# Patient Record
Sex: FEMALE | Race: WHITE
[De-identification: names, ages, dates, MRNs, and addresses within clinical notes are randomized per-mention and may not be internally consistent; named-entity substitution may affect disease eponyms.]

---

## 2017-06-19 ENCOUNTER — HOSPITAL ENCOUNTER (EMERGENCY)
Dept: HOSPITAL 76 - ED | Age: 27
Discharge: HOME | End: 2017-06-19
Payer: COMMERCIAL

## 2017-06-19 VITALS — DIASTOLIC BLOOD PRESSURE: 89 MMHG | SYSTOLIC BLOOD PRESSURE: 135 MMHG

## 2017-06-19 DIAGNOSIS — R03.0: ICD-10-CM

## 2017-06-19 DIAGNOSIS — H66.93: Primary | ICD-10-CM

## 2017-06-19 PROCEDURE — 99283 EMERGENCY DEPT VISIT LOW MDM: CPT

## 2017-06-19 NOTE — ED PHYSICIAN DOCUMENTATION
History of Present Illness





- Stated complaint


Stated Complaint: EAR PX BILATERAL





- Chief complaint


Chief Complaint: General





- History obtained from


History obtained from: Patient





- Additonal information


Additional information: 





A few days of L eye Irritation, with some discharge especially in the morning, 

rhinorrhea, and left ear popping now with severe right ear pain, and muffled 

hearing but no drainage, no fevers, no possibility of pregnancy.





Review of Systems


Constitutional: denies: Fever, Chills


Eyes: reports: Discharge, Irritation.  denies: Loss of vision


Ears: reports: Loss of hearing, Ear pain.  denies: Drainage/discharge


Nose: reports: Rhinorrhea / runny nose, Congestion


Throat: reports: Sore throat





PD PAST MEDICAL HISTORY





- Past Medical History


HEENT: Other





- Past Surgical History


Past Surgical History: No





- Present Medications


Home Medications: 


 Ambulatory Orders











 Medication  Instructions  Recorded  Confirmed


 


Norethindrone-Ethinyl Estrad 1 mg PO DAILY 09/22/14 12/10/16





[Nortrel 0.5-35 Tablet]   


 


Triamcinolone Acetonide [Nasacort] 1 applic NS DAILY 09/22/14 12/10/16


 


Amox/Clav 875/125 [Augmentin] 1 each PO Q12H #20 tablet 06/19/17 


 


HYDROcod/ACETAM 5/325 [Norco 5/325] 1 - 2 ea PO Q6H PRN #10 tablet 06/19/17 














- Allergies


Allergies/Adverse Reactions: 


 Allergies











Allergy/AdvReac Type Severity Reaction Status Date / Time


 


No Known Drug Allergies Allergy   Verified 06/19/17 21:06














- Social History


Does the pt smoke?: No


Smoking Status: Never smoker


Does the pt drink ETOH?: Yes


Does the pt have substance abuse?: No





- Immunizations


Immunizations are current?: Yes





- POLST


Patient has POLST: No





PD ED PE NORMAL





- Vitals


Vital signs reviewed: Yes





- General


General: Alert and oriented X 3, No acute distress





- HEENT


HEENT: Other (Severe right otitis media, almost to the point of rupture, 

counseled on signs and symptoms for rupture and necessity to followup if it 

happens, she has a viral appearing conjunctivitis on the left, oropharynx 

appears normal, the left TM is red but without other signs of otitis.)





- Neck


Neck: Supple, no meningeal sign, No bony TTP





- Neuro


Neuro: Alert and oriented X 3, Normal speech





- Psych


Psych: Normal mood, Normal affect





Results





- Vitals


Vitals: 


 Vital Signs - 24 hr











  06/19/17





  21:01


 


Temperature 36.6 C


 


Heart Rate 81


 


Respiratory 17





Rate 


 


Blood Pressure 151/105 H


 


O2 Saturation 100








 Oxygen











O2 Source                      Room air

















Departure





- Departure


Disposition: 01 Home, Self Care


Clinical Impression: 


 Otitis media


Condition: Good


Record reviewed to determine appropriate education?: Yes


Instructions:  ED Otitis Media Acute Adult


Prescriptions: 


Amox/Clav 875/125 [Augmentin] 1 each PO Q12H #20 tablet


HYDROcod/ACETAM 5/325 [Norco 5/325] 1 - 2 ea PO Q6H PRN #10 tablet


 PRN Reason: Pain


Comments: 


Call your doctor to arrange a follow up appointment. Make an appointment for 

one week from now. In the interim return anytime if worse or if new symptoms 

develop.





Your blood pressure was elevated today on check in to the emergency department. 

This does not mean that you have hypertension, it is a common phenomenon to 

check into the emergency department and have elevated blood pressure. I 

recommend that you see your primary care physician within the week to have it 

rechecked when you're feeling better.

## 2017-07-24 ENCOUNTER — HOSPITAL ENCOUNTER (OUTPATIENT)
Dept: HOSPITAL 76 - LAB.R | Age: 27
Discharge: HOME | End: 2017-07-24
Attending: PHYSICIAN ASSISTANT
Payer: COMMERCIAL

## 2017-07-24 DIAGNOSIS — J02.9: Primary | ICD-10-CM

## 2017-07-24 PROCEDURE — 87070 CULTURE OTHR SPECIMN AEROBIC: CPT

## 2018-04-15 ENCOUNTER — HOSPITAL ENCOUNTER (EMERGENCY)
Dept: HOSPITAL 76 - ED | Age: 28
Discharge: HOME | End: 2018-04-15
Payer: COMMERCIAL

## 2018-04-15 VITALS — SYSTOLIC BLOOD PRESSURE: 143 MMHG | DIASTOLIC BLOOD PRESSURE: 93 MMHG
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
## 2018-04-15 DIAGNOSIS — S44.91XA: ICD-10-CM

## 2018-04-15 DIAGNOSIS — S09.90XA: Primary | ICD-10-CM

## 2018-04-15 PROCEDURE — 99283 EMERGENCY DEPT VISIT LOW MDM: CPT

## 2018-04-15 PROCEDURE — 70450 CT HEAD/BRAIN W/O DYE: CPT

## 2018-04-15 NOTE — CT REPORT
EXAM:

CT HEAD

 

EXAM DATE: 4/15/2018 10:33 PM.

 

CLINICAL HISTORY: Head injury, R temporal headache, tenderness.

 

COMPARISON: None.

 

TECHNIQUE: Multiaxial CT images were obtained from the foramen magnum to the vertex. Reformats: Coron
al. IV contrast: None.

 

In accordance with CT protocol optimization, one or more of the following dose reduction techniques w
ere utilized for this exam: automated exposure control, adjustment of mA and/or KV based on patient s
ize, or use of iterative reconstructive technique.

 

FINDINGS:

Parenchyma: No intraparenchymal hemorrhage. No evidence of mass, midline shift, or CT findings of inf
arction. Gray-white differentiation is distinct. 

 

Extraaxial Spaces: Normal for age. No subdural or epidural collections identified.

 

Ventricles: Normal in size and position.

 

Sinuses and Orbits: Imaged paranasal sinuses, orbits, and mastoids show no significant abnormality.

 

Bones: No evidence of fracture or calvarial defect.

 

Other: None.

 

IMPRESSION: Normal head CT.

 

[facility]

Referring Provider Line: [phone]

 

SITE ID: [id number]

## 2018-04-15 NOTE — ED PHYSICIAN DOCUMENTATION
History of Present Illness





- Stated complaint


Stated Complaint: HEAD PX





- Chief complaint


Chief Complaint: Trauma Hd/Nk





- History obtained from


History obtained from: Patient, Friend





- History of Present Illness


Timing: Yesterday


Pain level max: 6


Pain level now: 3


Improved by: rest


Worsened by: movement





- Additonal information


Additional information: 





Patient is a 27-year-old female who was participating in an EMT class, she was 

acting out a scenario when she went down to the ground and accidentally struck 

her head on another participants knee.  Hit the right parietal area as well as 

the right ear.  States that she "saw stars".  Has had waxing and waning 

headache since that time.  No loss of consciousness.  She also has had 

intermittent numbness and tingling to the outer aspect of the right arm since 

that time.  No neck or back pain.





Review of Systems


Eyes: denies: Decreased vision, Photophobia


Ears: reports: Ear pain (R ear)


Throat: denies: Sore throat


Cardiac: denies: Chest pain / pressure


Respiratory: denies: Cough


GI: denies: Nausea, Vomiting


: denies: Now pregnant EGA


Skin: denies: Rash


Musculoskeletal: denies: Neck pain, Back pain


Neurologic: denies: Focal weakness, Confused, Altered mental status





PD PAST MEDICAL HISTORY





- Past Medical History


Past Medical History: Yes


HEENT: Other





- Past Surgical History


Past Surgical History: Yes


Ortho: Knee replacement





- Present Medications


Home Medications: 


 Ambulatory Orders











 Medication  Instructions  Recorded  Confirmed


 


Norethindrone-Ethinyl Estrad 1 mg PO DAILY 09/22/14 12/10/16





[Nortrel 0.5-35 Tablet]   


 


Triamcinolone Acetonide [Nasacort] 1 applic NS DAILY 09/22/14 12/10/16


 


Amox/Clav 875/125 [Augmentin] 1 each PO Q12H #20 tablet 06/19/17 


 


HYDROcod/ACETAM 5/325 [Norco 5/325] 1 - 2 ea PO Q6H PRN #10 tablet 06/19/17 














- Allergies


Allergies/Adverse Reactions: 


 Allergies











Allergy/AdvReac Type Severity Reaction Status Date / Time


 


No Known Drug Allergies Allergy   Verified 04/15/18 21:49














- Social History


Does the pt smoke?: No


Smoking Status: Never smoker


Does the pt drink ETOH?: Yes


Does the pt have substance abuse?: No





- Immunizations


Immunizations are current?: Yes





- POLST


Patient has POLST: No





PD ED PE NORMAL





- Vitals


Vital signs reviewed: Yes





- General


General: Alert and oriented X 3, No acute distress





- HEENT


HEENT: PERRL, Moist mucous membranes, Pharynx benign, Other (R pinna mild 

ecchymosis, no swelling. No hemotympanum B.)





- Neck


Neck: Supple, no meningeal sign, No bony TTP, Other (FROM without pain. Neg 

spurlings test B.)





- Cardiac


Cardiac: RRR, Strong equal pulses





- Respiratory


Respiratory: No respiratory distress, Clear bilaterally





- Abdomen


Abdomen: Soft, Non tender, Non distended





- Back


Back: No spinal TTP





- Derm


Derm: Warm and dry





- Extremities


Extremities: No tenderness to palpate, Normal ROM s pain





- Neuro


Neuro: Alert and oriented X 3, CNs 2-12 intact, No motor deficit, No sensory 

deficit, Normal speech


Eye Opening: Spontaneous


Motor: Obeys Commands


Verbal: Oriented


GCS Score: 15





- Psych


Psych: Normal mood, Normal affect





Results





- Vitals


Vitals: 


 Vital Signs - 24 hr











  04/15/18





  21:40


 


Temperature 36.0 C L


 


Heart Rate 89


 


Respiratory 16





Rate 


 


Blood Pressure 143/93 H


 


O2 Saturation 100








 Oxygen











O2 Source                      Room air

















- Rads (name of study)


  ** head CT


Radiology: Prelim report reviewed, EMP read contemporaneously, See rad report (

normal)





PD MEDICAL DECISION MAKING





- ED course


Complexity details: reviewed results, re-evaluated patient, considered 

differential, d/w patient


ED course: 





Patient is a 27-year-old female who presents to the emergency department after 

striking the right side of her head on a another individual's knee yesterday.  

This is over the area of the middle meningeal artery, given her progressive 

symptoms, head CT was performed which does not reveal any acute abnormalities.  

She also appears to have intermittent neurapraxia of the right upper extremity.

  Expect that this will resolve over time.  No acute neurological deficits at 

this time.  No evidence of cervical spine fracture.  We will continue 

supportive care and follow-up with her doctor as needed.  Patient counseled 

regarding signs and symptoms for which I believe and urgent re-evaluation would 

be necessary. Patient with good understanding of and agreement to plan and is 

comfortable going home at this time





This document was made in part using voice recognition software. While efforts 

are made to proofread this document, sound alike and grammatical errors may 

occur.





Departure





- Departure


Disposition: 01 Home, Self Care


Clinical Impression: 


Head injury


Qualifiers:


 Encounter type: initial encounter Qualified Code(s): S09.90XA - Unspecified 

injury of head, initial encounter





Neuropraxia of right upper extremity


Qualifiers:


 Encounter type: initial encounter Qualified Code(s): S44.91XA - Injury of 

unspecified nerve at shoulder and upper arm level, right arm, initial encounter





Condition: Good


Instructions:  ED Head Injury Closed


Follow-Up: 


Ce Mathis PA-C [Primary Care Provider] - As Needed


Comments: 


Return if you worsen. This should improve over the next few days. 


Discharge Date/Time: 04/15/18 23:21

## 2018-05-08 ENCOUNTER — HOSPITAL ENCOUNTER (OUTPATIENT)
Dept: HOSPITAL 76 - DI | Age: 28
Discharge: HOME | End: 2018-05-08
Attending: PHYSICIAN ASSISTANT
Payer: COMMERCIAL

## 2018-05-08 DIAGNOSIS — S06.0X0A: Primary | ICD-10-CM

## 2018-05-08 PROCEDURE — 70450 CT HEAD/BRAIN W/O DYE: CPT

## 2018-05-08 NOTE — CT PRELIMINARY REPORT
Accession: [id number]

Exam: CT HEAD W/O

 

IMPRESSION: Negative nonenhanced head CT.

 

[facility]

 

SITE ID: [id number]

## 2018-09-22 ENCOUNTER — HOSPITAL ENCOUNTER (OUTPATIENT)
Dept: HOSPITAL 76 - EMS | Age: 28
Discharge: ANOTHER HOSPITAL | End: 2018-09-22
Attending: SURGERY
Payer: COMMERCIAL

## 2018-09-22 ENCOUNTER — HOSPITAL ENCOUNTER (EMERGENCY)
Dept: HOSPITAL 76 - ED | Age: 28
Discharge: HOME | End: 2018-09-22
Payer: COMMERCIAL

## 2018-09-22 VITALS — SYSTOLIC BLOOD PRESSURE: 127 MMHG | DIASTOLIC BLOOD PRESSURE: 93 MMHG
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
## 2018-09-22 DIAGNOSIS — S01.01XA: Primary | ICD-10-CM
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
## 2018-09-22 DIAGNOSIS — M25.572: ICD-10-CM

## 2018-09-22 DIAGNOSIS — M25.511: ICD-10-CM
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
## 2018-09-22 DIAGNOSIS — S01.01XA: ICD-10-CM

## 2018-09-22 DIAGNOSIS — S93.401A: ICD-10-CM

## 2018-09-22 DIAGNOSIS — S13.9XXA: ICD-10-CM

## 2018-09-22 DIAGNOSIS — S09.90XA: Primary | ICD-10-CM

## 2018-09-22 PROCEDURE — 12002 RPR S/N/AX/GEN/TRNK2.6-7.5CM: CPT

## 2018-09-22 PROCEDURE — 73610 X-RAY EXAM OF ANKLE: CPT

## 2018-09-22 PROCEDURE — 70450 CT HEAD/BRAIN W/O DYE: CPT

## 2018-09-22 PROCEDURE — 99283 EMERGENCY DEPT VISIT LOW MDM: CPT

## 2018-09-22 PROCEDURE — 99284 EMERGENCY DEPT VISIT MOD MDM: CPT

## 2018-09-22 NOTE — CT REPORT
Reason:  fall down stairs posterior hematoma lac HA

Procedure Date:  09/22/2018   

Accession Number:  [id number] / [id number]                    

Procedure:  CT  - Head W/O CPT Code:  

 

FULL RESULT:

 

 

EXAM:

CT HEAD

 

EXAM DATE: 9/22/2018 08:04 AM.

 

CLINICAL HISTORY: Fall down stairs posterior hematoma lac HA.

 

COMPARISON: None.

 

TECHNIQUE: Multiaxial CT images were obtained from the foramen magnum to 

the vertex. Reformats: Sagittal and coronal. IV contrast: None.

 

In accordance with CT protocol optimization, one or more of the following 

dose reduction techniques were utilized for this exam: automated exposure 

control, adjustment of mA and/or KV based on patient size, or use of 

iterative reconstructive technique.

 

FINDINGS:

Parenchyma: No intraparenchymal hemorrhage. No evidence of mass, midline 

shift, or CT findings of infarction. Gray-white differentiation is 

distinct.

 

Extraaxial Spaces: Normal for age. No subdural or epidural collections 

identified.

 

Ventricles: Normal in size and position.

 

Sinuses and Orbits: Imaged paranasal sinuses, orbits, and mastoids show 

no significant abnormality.

 

Bones: No evidence of fracture or calvarial defect.

 

Other: None.

IMPRESSION: No acute intracranial abnormality.

 

[facility]

## 2018-09-22 NOTE — ED PHYSICIAN DOCUMENTATION
History of Present Illness





- Stated complaint


Stated Complaint: FALL/ HEAD LAC





- Chief complaint


Chief Complaint: Trauma Hd/Nk





- Additonal information


Additional information: 





hx from pt


29 y/o f


[facility] staff


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 

her boots unzipped


missed last step and fell


struck head and has a posterior hematoma and lac with heavy bleeding, no LOC but

dazed and feels nauseated and has a terrible HA, several recent concussions as 

well


aggrevated her known R rotator cuff injury


and twisted her R ankle


denies preg


Tdap 6 yr ago








Review of Systems


Constitutional: denies: Fever


Ears: denies: Ear pain, Drainage/discharge


Nose: denies: Epistaxis


Cardiac: denies: Chest pain / pressure


Respiratory: denies: Dyspnea


GI: denies: Abdominal Pain


: denies: Now pregnant EGA


Musculoskeletal: reports: Neck pain (right side, not midline)


Neurologic: reports: Headache, Head injury.  denies: Focal weakness, Numbness, 

Syncope


Endocrine: denies: Easy bruising / bleeding


Immunocompromised: denies: Immunocompromised





PD PAST MEDICAL HISTORY





- Past Medical History


Past Medical History: No


HEENT: Other





- Past Surgical History


Past Surgical History: Yes


Ortho: Knee replacement





- Present Medications


Home Medications: 


                                Ambulatory Orders











 Medication  Instructions  Recorded  Confirmed


 


Norethindrone-Ethinyl Estrad 1 mg PO DAILY 09/22/14 12/10/16





[Nortrel 0.5-35 Tablet]   


 


Triamcinolone Acetonide [Nasacort] 1 applic NS DAILY 09/22/14 12/10/16


 


Amox/Clav 875/125 [Augmentin] 1 each PO Q12H #20 tablet 06/19/17 


 


HYDROcod/ACETAM 5/325 [Norco 5/325] 1 - 2 ea PO Q6H PRN #10 tablet 06/19/17 














- Allergies


Allergies/Adverse Reactions: 


                                    Allergies











Allergy/AdvReac Type Severity Reaction Status Date / Time


 


No Known Drug Allergies Allergy   Verified 09/22/18 06:58














- Social History


Does the pt smoke?: No


Smoking Status: Never smoker


Does the pt drink ETOH?: Yes


Does the pt have substance abuse?: No





- Immunizations


Immunizations are current?: Yes





- POLST


Patient has POLST: No





PD ED PE NORMAL





- Vitals


Vital signs reviewed: Yes





- General


General: Alert and oriented X 3





- HEENT


HEENT: No: Atraumatic (hematoma and lac R occiput)





- Neck


Neck: No bony TTP, Other (soft tissue TTP on R, cleared by NEXUS)





- Cardiac


Cardiac: RRR





- Respiratory


Respiratory: No respiratory distress





- Abdomen


Abdomen: Soft, Non tender





- Derm


Derm: Normal color





- Extremities


Extremities: Other (R ankel with lateral swellign and TTP, MSV intact, no 5th MT

TTP, R shoulder with painful ROM but able and no deformity and pt states feels 

like her known rotator cuff injury)





- Neuro


Neuro: Alert and oriented X 3


Eye Opening: Spontaneous


Motor: Obeys Commands


Verbal: Oriented


GCS Score: 15





Results





- Vitals


Vitals: 


                               Vital Signs - 24 hr











  09/22/18





  06:54


 


Temperature 36.6 C


 


Heart Rate 94


 


Respiratory 18





Rate 


 


Blood Pressure 131/88 H


 


O2 Saturation 100








                                     Oxygen











O2 Source                      Room air

















Procedures





- Laceration (location)


  ** scalp


Length in cm: 3


Wound type: Linear


Neurovascular status: Sensory intact, Motor intact


Anesthesia: Marcaine 0.5% with epi


Wound Preparation: Irrigated copiously NS (by Mera)


Skin layer closure: Staples (4)


Complexity: Simple





- C spine clearance


Nexus C spine guidelines: No: Abnormal mental status, Intoxicated, Distracting 

injury, C/of midline pain, Parasthesias/neuro def, Bony tenderness, Pain with 

ROM, Other





PD MEDICAL DECISION MAKING





- Sepsis Event


Vital Signs: 


                               Vital Signs - 24 hr











  09/22/18





  06:54


 


Temperature 36.6 C


 


Heart Rate 94


 


Respiratory 18





Rate 


 


Blood Pressure 131/88 H


 


O2 Saturation 100








                                     Oxygen











O2 Source                      Room air

















Departure





- Departure


Disposition: 01 Home, Self Care


Clinical Impression: 


Head injury


Qualifiers:


 Encounter type: initial encounter Qualified Code(s): S09.90XA - Unspecified 

injury of head, initial encounter





Laceration of scalp


Qualifiers:


 Encounter type: initial encounter Qualified Code(s): S01.01XA - Laceration 

without foreign body of scalp, initial encounter





Acute neck sprain


Qualifiers:


 Encounter type: initial encounter Qualified Code(s): S13.9XXA - Sprain of 

joints and ligaments of unspecified parts of neck, initial encounter





Ankle sprain


Qualifiers:


 Encounter type: initial encounter Involved ligament of ankle: unspecified 

ligament Laterality: right Qualified Code(s): S93.401A - Sprain of unspecified 

ligament of right ankle, initial encounter





Fall down stairs


Qualifiers:


 Encounter type: initial encounter Qualified Code(s): W10.8XXA - Fall (on) 

(from) other stairs and steps, initial encounter





Condition: Good


Instructions:  ED Sprain Ankle W X Ray, ED Head Injury Closed, ED Sprain Strain 

Neck


Follow-Up: 


FREEDOM THOMAS [Primary Care Provider] - 


Comments: 


The CT showed no skull fracture or brain bleeding swelling


The ankle xray did not show a fracture


It is safe for you to go home


May shower with the staples


Apply antibiotic ointment every day


Staples out in 10 days


Wear the walking boot as needed - take it off when sitting and sleeping to 

prevent forming blood clots from immobilization.


If your ankle is still too painful to walk in 2 weeks, please see you PMD for a 

recheck and consideration of further imaging.


Motrin tylenol and ice as needed for pain


Off work today and tomorrow


May return to work Monday but will be on limited weight bearing status for up to

2 weeks while your ankle heals


Return if worse





Forms:  Activity restrictions

## 2018-09-22 NOTE — XRAY REPORT
Reason:  fall down stairs

Procedure Date:  09/22/2018   

Accession Number:  [id number] / [id number]                    

Procedure:  XR  - Ankle 3 View RT CPT Code:  

 

FULL RESULT:

 

 

EXAM:

RIGHT ANKLE RADIOGRAPHY

 

EXAM DATE: 9/22/2018 07:55 AM.

 

CLINICAL HISTORY: Fall down stairs.

 

COMPARISON: None.

 

TECHNIQUE: 3 views.

 

FINDINGS:

Bones: Normal. No fractures or bone lesions.

 

Joints: Normal. No effusion. No subluxations. The ankle mortise is 

normally aligned.

 

Soft Tissues: Normal. No soft tissue swelling.

IMPRESSION: Normal ankle radiography.

 

[facility]

## 2018-10-09 ENCOUNTER — HOSPITAL ENCOUNTER (OUTPATIENT)
Dept: HOSPITAL 76 - DI | Age: 28
Discharge: HOME | End: 2018-10-09
Attending: FAMILY MEDICINE
Payer: COMMERCIAL

## 2018-10-09 DIAGNOSIS — M67.911: ICD-10-CM

## 2018-10-09 DIAGNOSIS — S43.491A: Primary | ICD-10-CM

## 2018-10-09 NOTE — MRI REPORT
Reason:  PAIN IN RIGHT SHOULDER

Procedure Date:  10/09/2018   

Accession Number:  [id number] / [id number]                    

Procedure:  MRI - Shoulder RT W/O CPT Code:  

 

FULL RESULT:

 

 

EXAM:

RIGHT SHOULDER MRI WITHOUT CONTRAST

 

EXAM DATE: 10/9/2018 01:46 PM.

 

CLINICAL HISTORY: PAIN IN RIGHT SHOULDER.

 

COMPARISON: None.

 

TECHNIQUE: Multiplanar, multisequence T1-weighted and fluid-sensitive 

sequences of the shoulder without contrast. Other: None.

 

FINDINGS:

Rotator cuff: Patchy increased T2 signal involving distal fibers of the 

supraspinatus and infraspinatus. No rotator cuff tear identified. No 

significant rotator cuff muscle atrophy or fatty replacement.

 

Long head biceps tendon: Intact demonstrating normal course, signal and 

morphology.

 

Labrum: Tear at the inferior and anteroinferior labrum with 

anteroinferior para labral cyst formation measuring up to 4.2 x 2.4 x 3.5 

cm. Extension deep to the subscapularis. No extension to the 

quadrilateral space.

 

Bones and articular surfaces: No significant articular cartilage defects.

 

Acromioclavicular joint: Normal appearance. Type I acromion.

IMPRESSION:

1. Tear of the inferior and anteroinferior labrum with anteroinferior 

para labral cyst formation.

2. Mild supraspinatus and infraspinatus tendinosis.

 

[facility] MUSCULOSKELETAL RADIOLOGY SECTION

## 2018-12-27 ENCOUNTER — HOSPITAL ENCOUNTER (EMERGENCY)
Dept: HOSPITAL 76 - ED | Age: 28
LOS: 1 days | Discharge: HOME | End: 2018-12-28
Payer: COMMERCIAL

## 2018-12-27 VITALS — SYSTOLIC BLOOD PRESSURE: 147 MMHG | DIASTOLIC BLOOD PRESSURE: 91 MMHG
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
## 2018-12-27 DIAGNOSIS — Z96.659: ICD-10-CM

## 2018-12-27 DIAGNOSIS — S39.012A: Primary | ICD-10-CM

## 2018-12-27 PROCEDURE — 99283 EMERGENCY DEPT VISIT LOW MDM: CPT

## 2018-12-27 NOTE — ED PHYSICIAN DOCUMENTATION
PD HPI BACK PAIN





- Stated complaint


Stated Complaint: BACK PX





- Chief complaint


Chief Complaint: Back Pain





- History obtained from


History obtained from: Patient, Family





- History of Present Illness


Timing - onset: Yesterday


Timing - duration: Days (1)


Timing - details: Gradual onset, Still present


Location: Lower, Left


Quality: Pain, Spasm


Associated symptoms: No: Fever, Weakness, Numbness, Incontinent of urine, Unable

to urinate, Hematuria, Incontinent of stool


Improves with: Rest, Position


Worsened by: Movement


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


Similar symptoms before: Has not had sx before


Recently seen: Not recently seen





- Additional information


Additional information: 





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 

night toward the end of her shift.  She does not recall any specific injury 

during the shift and does not recall a specific injury over the past 4 days.  

She does lift patients frequently.  She has tried some Flexeril last night was 

unable to sleep. She has not been able to control the pain. She describes the 

pain as intense spasm of the lower paraspinal muscles.





Review of Systems


Constitutional: denies: Fever


Eyes: denies: Decreased vision


Ears: denies: Ear pain


Nose: denies: Congestion


Throat: denies: Sore throat


Cardiac: denies: Chest pain / pressure, Palpitations


Respiratory: denies: Dyspnea, Cough


GI: denies: Abdominal Pain, Nausea, Vomiting, Constipation, Diarrhea


: denies: Dysuria, Frequency


Skin: denies: Rash


Musculoskeletal: reports: Back pain.  denies: Neck pain, Extremity pain


Neurologic: denies: Generalized weakness, Focal weakness, Numbness





PD PAST MEDICAL HISTORY





- Past Medical History


HEENT: Other





- Past Surgical History


Past Surgical History: Yes


Ortho: Knee replacement





- Present Medications


Home Medications: 


                                Ambulatory Orders











 Medication  Instructions  Recorded  Confirmed


 


Norethindrone-Ethinyl Estrad 1 mg PO DAILY 09/22/14 12/10/16





[Nortrel 0.5-35 Tablet]   


 


Triamcinolone Acetonide [Nasacort] 1 applic NS DAILY 09/22/14 12/10/16


 


Amox/Clav 875/125 [Augmentin] 1 each PO Q12H #20 tablet 06/19/17 


 


HYDROcod/ACETAM 5/325 [Norco 5/325] 1 - 2 ea PO Q6H PRN #10 tablet 06/19/17 


 


Cyclobenzaprine [Flexeril] 10 mg PO TID PRN #20 tablet 12/27/18 


 


Hydrocodone/Acetaminophen 1 - 2 each PO Q6H PRN #14 tablet 12/27/18 





[Hydrocodon-Acetaminophen 5-325]   














- Allergies


Allergies/Adverse Reactions: 


                                    Allergies











Allergy/AdvReac Type Severity Reaction Status Date / Time


 


No Known Drug Allergies Allergy   Verified 12/27/18 22:59














- Social History


Does the pt smoke?: No


Smoking Status: Never smoker


Does the pt drink ETOH?: Yes


Does the pt have substance abuse?: No





- Immunizations


Immunizations are current?: Yes





- POLST


Patient has POLST: No





PD ED PE NORMAL





- Vitals


Vital signs reviewed: Yes (tachy and hypertensive )





- General


General: Alert and oriented X 3, No acute distress, Well developed/nourished





- HEENT


HEENT: Atraumatic, PERRL, EOMI





- Neck


Neck: Supple, no meningeal sign, No bony TTP





- Respiratory


Respiratory: No respiratory distress





- Back


Back: No CVA TTP, No spinal TTP, Other (There is mild point tenderness to the 

lower lumbar paraspinous muscles bilaterally)





- Derm


Derm: Normal color, Warm and dry, No rash





- Extremities


Extremities: No deformity, No edema, No calf tenderness / cord





- Neuro


Neuro: Alert and oriented X 3, CNs 2-12 intact, No motor deficit, No sensory 

deficit, Normal speech, Other (normal dorsiflexion bilaterally )


Eye Opening: Spontaneous


Motor: Obeys Commands


Verbal: Oriented


GCS Score: 15





- Psych


Psych: Normal mood, Normal affect





Results





- Vitals


Vitals: 


                               Vital Signs - 24 hr











  12/27/18





  22:54


 


Temperature 36.1 C L


 


Heart Rate 116 H


 


Respiratory 17





Rate 


 


Blood Pressure 147/91 H


 


O2 Saturation 97








                                     Oxygen











O2 Source                      Room air

















PD MEDICAL DECISION MAKING





- ED course


Complexity details: considered differential, d/w patient, d/w family


ED course: 





28-year-old female with acute lumbar muscle spasm is administered DEXA methadone

10 mg orally and we will place her on some Vicodin and Flexeril.





Departure





- Departure


Disposition: 01 Home, Self Care


Clinical Impression: 


Acute lumbar myofascial strain


Qualifiers:


 Encounter type: initial encounter Qualified Code(s): S39.012A - Strain of 

muscle, fascia and tendon of lower back, initial encounter





Condition: Stable


Instructions:  ED Sprain Strain Lumbar


Follow-Up: 


FREEDOM THOMAS [Primary Care Provider] - 


Prescriptions: 


Cyclobenzaprine [Flexeril] 10 mg PO TID PRN #20 tablet


 PRN Reason: Spasms


Hydrocodone/Acetaminophen [Hydrocodon-Acetaminophen 5-325] 1 - 2 each PO Q6H PRN

#14 tablet


 PRN Reason: pain


Forms:  Activity restrictions

## 2019-10-16 ENCOUNTER — HOSPITAL ENCOUNTER (EMERGENCY)
Dept: HOSPITAL 76 - ED | Age: 29
Discharge: HOME | End: 2019-10-16
Payer: COMMERCIAL

## 2019-10-16 VITALS — SYSTOLIC BLOOD PRESSURE: 144 MMHG | DIASTOLIC BLOOD PRESSURE: 111 MMHG
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
## 2019-10-16 DIAGNOSIS — S46.911A: Primary | ICD-10-CM
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
## 2019-10-16 PROCEDURE — 73030 X-RAY EXAM OF SHOULDER: CPT

## 2019-10-16 PROCEDURE — 99284 EMERGENCY DEPT VISIT MOD MDM: CPT

## 2019-10-16 PROCEDURE — 99283 EMERGENCY DEPT VISIT LOW MDM: CPT

## 2019-10-16 NOTE — XRAY REPORT
Reason:  right shoulder strain, twisted by pt

Procedure Date:  10/16/2019   

Accession Number:  [id number] / [id number]                    

Procedure:  XR  - Shoulder 3 View RT CPT Code:  

 

FULL RESULT:

 

 

EXAM:

RIGHT SHOULDER RADIOGRAPHY

 

EXAM DATE: 10/16/2019 01:12 AM.

 

CLINICAL HISTORY: Right shoulder strain, twisted by pt.

 

COMPARISON: None.

 

TECHNIQUE: 3 views.

 

FINDINGS:

Bones: Normal. No fracture or bone lesion.

 

Joints: The glenohumeral and acromioclavicular joints are normal.

 

Soft tissues: The visualized hemithorax is unremarkable. No soft tissue 

swelling.

IMPRESSION: Normal shoulder radiography.

 

[facility]

## 2019-10-16 NOTE — ED PHYSICIAN DOCUMENTATION
PD HPI UPPER EXT INJURY





- Stated complaint


Stated Complaint: R SHOULDER PX





- Chief complaint


Chief Complaint: Trauma Ext





- History obtained from


History obtained from: Patient





- History of Present Illness


Location: Right, Shoulder


Type of injury: Twist (She was working in the emergency department and was 

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

patient grabbed that her arm and pulled out it.  She states she felt her 

shoulder have a pop feeling and has been hurting with range of motion since that

time.)


Where injury occurred: Work


Timing - onset: How many hours ago (1)


Timing - duration: Hours (1)


Timing - details: Abrupt onset, Still present


Worsened by: Moving


Associated symptoms: No: Weakness, Numbness


Similar symptoms before: Has not had sx before (She has been having pain in her 

shoulder from a labral tear limiting use somewhat.  There is no rotator cuff 

injury and she has not had pain like the current injury per se.)





Review of Systems


Skin: denies: Abrasion (s), Laceration (s)


Neurologic: reports: Numbness (feels her hand has numbness since the injury.).  

denies: Focal weakness





PD PAST MEDICAL HISTORY





- Past Medical History


Cardiovascular: None


Respiratory: None


Neuro: None


Endocrine/Autoimmune: None


HEENT: Other


Musculoskeletal: Other (right shoulder labral tear, and seeing Dr. Byrne)





- Past Surgical History


Past Surgical History: Yes


Ortho: Knee replacement





- Present Medications


Home Medications: 


                                Ambulatory Orders











 Medication  Instructions  Recorded  Confirmed


 


Norethindrone-Ethinyl Estrad 1 mg PO DAILY 09/22/14 12/10/16





[Nortrel 0.5-35 Tablet]   


 


Triamcinolone Acetonide [Nasacort] 1 applic NS DAILY 09/22/14 12/10/16


 


Amox/Clav 875/125 [Augmentin] 1 each PO Q12H #20 tablet 06/19/17 


 


HYDROcod/ACETAM 5/325 [Norco 5/325] 1 - 2 ea PO Q6H PRN #10 tablet 06/19/17 


 


Cyclobenzaprine [Flexeril] 10 mg PO TID PRN #20 tablet 12/27/18 


 


Hydrocodone/Acetaminophen 1 - 2 each PO Q6H PRN #14 tablet 12/27/18 





[Hydrocodon-Acetaminophen 5-325]   


 


Tizanidine HCl 4 mg PO TID PRN #25 capsule 10/16/19 














- Allergies


Allergies/Adverse Reactions: 


                                    Allergies











Allergy/AdvReac Type Severity Reaction Status Date / Time


 


No Known Drug Allergies Allergy   Verified 12/27/18 22:59














- Social History


Does the pt smoke?: No


Smoking Status: Never smoker


Does the pt drink ETOH?: Yes


Does the pt have substance abuse?: No





- Immunizations


Immunizations are current?: Yes





- POLST


Patient has POLST: No





PD ED PE NORMAL





- Vitals


Vital signs reviewed: Yes





- General


General: Alert and oriented X 3, Well developed/nourished, Other (She appears 

uncomfortable with her arm down to her side.  She has discomfort with raising it

up to a neutral position so it is having hand and arm dependent with some 

increased vascular coloring.  There is good color and capillary refill of the 

nailbeds.  There is good pulses at the wrists.  She has sensation present 

throughout the arm and forearm.)





- Neck


Neck: Supple, no meningeal sign, No bony TTP, No adenopathy





- Derm


Derm: Normal color, Warm and dry





- Extremities


Extremities: Other (The right shoulder is tender along the anterolateral aspect.

 There is no obvious deformity noted.  Limited range of motion due to discomfort

and pain.  There is no obvious effusion.  Pulses color and capillary refill are 

normal in the wrist and fingers.  She has sensation present to touch in the d

ermatomes.)





- Neuro


Neuro: No motor deficit, No sensory deficit





Results





- Vitals


Vitals: 


                               Vital Signs - 24 hr











  10/16/19 10/16/19 10/16/19





  00:25 02:16 02:20


 


Temperature 36.5 C  


 


Heart Rate 76 70 


 


Respiratory 16 16 





Rate   


 


Blood Pressure 137/87 H  144/111 H


 


O2 Saturation 100 100 














  10/16/19





  02:26


 


Temperature 


 


Heart Rate 


 


Respiratory 18





Rate 


 


Blood Pressure 


 


O2 Saturation 








                                     Oxygen











O2 Source                      Room air

















- Rads (name of study)


  ** right shoulder


Radiology: Prelim report reviewed (No fracture no dislocation.), See rad report





PD MEDICAL DECISION MAKING





- ED course


Complexity details: reviewed results (normal xray), considered differential, d/w

patient





Departure





- Departure


Disposition: 01 Home, Self Care


Clinical Impression: 


Right shoulder strain


Qualifiers:


 Encounter type: initial encounter Qualified Code(s): S46.911A - Strain of 

unspecified muscle, fascia and tendon at shoulder and upper arm level, right 

arm, initial encounter





Condition: Stable


Record reviewed to determine appropriate education?: Yes


Instructions:  ED Sprain Shoulder


Follow-Up: 


Kevin Byrne MD [Provider Admit Priv/Credential] - 


Prescriptions: 


Tizanidine HCl 4 mg PO TID PRN #25 capsule


 PRN Reason: Spasms


Comments: 


Sling for the shoulder with gentle range of motion several times daily to help 

reduce stiffness.  Ice to the area periodically.  Use some anti-inflammatories 

such as ibuprofen or naproxen 2-3 times daily just regular dosing for the next 5

days.  Add tizanidine if needed for muscle spasms.  Add Tylenol for pain.  

Follow-up with Dr. Chi on Monday as scheduled.  Limited to no use of the 

right shoulder in the meantime.


Forms:  Activity restrictions


Discharge Date/Time: 10/16/19 02:35

## 2020-01-01 ENCOUNTER — HOSPITAL ENCOUNTER (EMERGENCY)
Dept: HOSPITAL 76 - ED | Age: 30
Discharge: HOME | End: 2020-01-01
Payer: COMMERCIAL

## 2020-01-01 VITALS — DIASTOLIC BLOOD PRESSURE: 98 MMHG | SYSTOLIC BLOOD PRESSURE: 153 MMHG

## 2020-01-01 DIAGNOSIS — H66.002: Primary | ICD-10-CM

## 2020-01-01 DIAGNOSIS — H10.32: ICD-10-CM

## 2020-01-01 PROCEDURE — 99284 EMERGENCY DEPT VISIT MOD MDM: CPT

## 2020-01-01 PROCEDURE — 99282 EMERGENCY DEPT VISIT SF MDM: CPT

## 2020-01-01 NOTE — ED PHYSICIAN DOCUMENTATION
PD HPI URI





- Stated complaint


Stated Complaint: EAR PX





- Chief complaint


Chief Complaint: Heent





- History obtained from


History obtained from: Patient





- History of Present Illness


Timing - onset: Yesterday


Timing duration: Days (2)


Timing details: Gradual onset


Pain level now: 5


Associated symptoms: Nasal congestion, Dry cough.  No: Fever, Sore throat


Recently seen: Not recently seen





- Additional information


Additional information: 





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 

department.  She presents with complaints that her left ear started hurting 

yesterday.  She is had a stuffy nose for which she is taking Benadryl and 

Excedrin and had a mild cough for 5 days ago and a bit of an itchy throat.  Her 

left eye is been a little gunky as well over the past couple of days.  The ear 

pain is been getting increasingly more severe tonight up to about a 4-5 out of 

10 and she felt like her equilibrium was off.  She is had a ruptured tympanic 

membrane in the past from an otitis media.  No drainage this time.  She had a 

temp yesterday of 99.8 no fever today.





Review of Systems


Constitutional: reports: Fever


Ears: reports: Ear pain


Nose: reports: Rhinorrhea / runny nose, Congestion


Throat: denies: Sore throat


Respiratory: reports: Cough


Neurologic: reports: Other (Equilibrium was off today)





PD PAST MEDICAL HISTORY





- Past Medical History


Past Medical History: Yes


Cardiovascular: None


Respiratory: None


Neuro: None


Endocrine/Autoimmune: None


GI: None


GYN: None


: None


HEENT: Other


Psych: None


Musculoskeletal: Other


Derm: None





- Past Surgical History


Past Surgical History: Yes


Ortho: Knee replacement





- Present Medications


Home Medications: 


                                Ambulatory Orders











 Medication  Instructions  Recorded  Confirmed


 


Norethindrone-Ethinyl Estrad 1 mg PO DAILY 09/22/14 12/10/16





[Nortrel 0.5-35 Tablet]   


 


Triamcinolone Acetonide [Nasacort] 1 applic NS DAILY 09/22/14 12/10/16


 


Amox/Clav 875/125 [Augmentin] 1 each PO Q12H #20 tablet 06/19/17 


 


HYDROcod/ACETAM 5/325 [Norco 5/325] 1 - 2 ea PO Q6H PRN #10 tablet 06/19/17 


 


Cyclobenzaprine [Flexeril] 10 mg PO TID PRN #20 tablet 12/27/18 


 


Hydrocodone/Acetaminophen 1 - 2 each PO Q6H PRN #14 tablet 12/27/18 





[Hydrocodon-Acetaminophen 5-325]   


 


Tizanidine HCl 4 mg PO TID PRN #25 capsule 10/16/19 


 


Amoxicillin 875 mg PO BID #20 tablet 01/01/20 














- Allergies


Allergies/Adverse Reactions: 


                                    Allergies











Allergy/AdvReac Type Severity Reaction Status Date / Time


 


No Known Drug Allergies Allergy   Verified 01/01/20 22:59














- Social History


Does the pt smoke?: No


Smoking Status: Never smoker


Does the pt drink ETOH?: Yes


Does the pt have substance abuse?: No





- Immunizations


Immunizations are current?: Yes





- POLST


Patient has POLST: No





PD ED PE NORMAL





- Vitals


Vital signs reviewed: Yes





- General


General: Alert and oriented X 3, No acute distress, Well developed/nourished





- HEENT


HEENT: Atraumatic, PERRL, Moist mucous membranes, Pharynx benign, Other (Left 

conjunctiva is mildly injected.  No mucopurulent drainage noted.  The left 

tympanic membrane is erythematous and dull there is fluid layering out behind 

it.  The right is clear.)





- Neck


Neck: Supple, no meningeal sign, No adenopathy





- Respiratory


Respiratory: No respiratory distress





- Derm


Derm: Normal color, Warm and dry





- Neuro


Neuro: Alert and oriented X 3, CNs 2-12 intact, Normal speech





- Psych


Psych: Normal mood, Normal affect





Results





- Vitals


Vitals: 





                               Vital Signs - 24 hr











  01/01/20





  22:59


 


Temperature 36.2 C L


 


Heart Rate 100


 


Respiratory 14





Rate 


 


Blood Pressure 153/98 H


 


O2 Saturation 100








                                     Oxygen











O2 Source                      Room air

















PD MEDICAL DECISION MAKING





- ED course


Complexity details: d/w patient


ED course: 





Patient with upper respiratory symptoms recently and now with left ear pain and 

otitis media.  She was given her first dose of amoxicillin here in the emergency

department and discharged with a prescription for 10 days.  Steam and popping 

the ears to help alleviate the pressure.  Follow-up if symptoms are worsening.





Departure





- Departure


Disposition: 01 Home, Self Care


Clinical Impression: 


Otitis media


Qualifiers:


 Otitis media type: suppurative Chronicity: acute Laterality: left Recurrence: 

not specified as recurrent Spontaneous tympanic membrane rupture: without 

spontaneous rupture Qualified Code(s): H66.002 - Acute suppurative otitis media 

without spontaneous rupture of ear drum, left ear





Conjunctivitis


Qualifiers:


 Conjunctivitis type: acute Acute conjunctivitis type: unspecified Laterality: 

left Qualified Code(s): H10.32 - Unspecified acute conjunctivitis, left eye





Condition: Good


Instructions:  ED Otitis Media Acute Adult


Follow-Up: 


FREEDOM THOMAS [Primary Care Provider] - 


Prescriptions: 


Amoxicillin 875 mg PO BID #20 tablet


Comments: 


Take the amoxicillin twice a day as prescribed.  I would recommend probiotics 

while you are taking the antibiotic and for 2 weeks after.  You can use steam 

and then try to pop the ear to help drain the fluid.  Ibuprofen if needed for 

pain or fever.  Follow-up if you have increasing pain or your eye symptoms are 

worsening.

## 2020-01-02 ENCOUNTER — HOSPITAL ENCOUNTER (OUTPATIENT)
Dept: HOSPITAL 76 - DI | Age: 30
Discharge: HOME | End: 2020-01-02
Attending: ORTHOPAEDIC SURGERY
Payer: COMMERCIAL

## 2020-01-02 DIAGNOSIS — S43.491A: Primary | ICD-10-CM

## 2020-01-02 DIAGNOSIS — M25.411: ICD-10-CM

## 2020-01-02 DIAGNOSIS — S43.431A: ICD-10-CM

## 2020-01-02 PROCEDURE — 23350 INJECTION FOR SHOULDER X-RAY: CPT

## 2020-01-02 PROCEDURE — 77002 NEEDLE LOCALIZATION BY XRAY: CPT

## 2020-01-02 PROCEDURE — 73222 MRI JOINT UPR EXTREM W/DYE: CPT

## 2020-01-02 NOTE — MRI REPORT
Reason:  SHOULDER INSTABILITY

Procedure Date:  01/02/2020   

Accession Number:  [id number] / [id number]                    

Procedure:  MRI - Arthrogram Shoulder RT CPT Code:  

 

***Final Report***

 

 

FULL RESULT:

 

 

EXAM:

RIGHT SHOULDER MRI ARTHROGRAM WITH CONTRAST

 

EXAM DATE: 1/2/2020 02:57 PM.

 

CLINICAL HISTORY: Right shoulder instability.

 

COMPARISON: None.

 

TECHNIQUE: Multiplanar, multisequence T1-weighted and fluid-sensitive 

sequences of the shoulder after an arthrographic injection of dilute 

gadolinium, dictated under a separate exam. Other: None.

 

FINDINGS:

Acromioclavicular Region: The acromion is type I. The acromioclavicular 

joint is unremarkable. The coracoacromial and coracoclavicular ligaments 

are intact. Small amount of fluid which does not contain contrast at the 

subacromial subdeltoid bursa.

 

Glenohumeral Region: No subluxation. No loose bodies. The articular 

cartilage is unremarkable. The glenohumeral ligaments and joint capsule 

are unremarkable.

 

Bone Marrow: No fracture, marrow edema or bone lesions.

 

Labrum: Tear at the inferior aspect of the labrum. There is a 2.2 x 1 x 2 

cm multiseptated paralabral cyst adjacent to the inferior aspect of the 

labrum and glenoid.

 

Biceps Tendon: The long head of the biceps tendon and biceps pulley are 

intact.

 

Musculature/Rotator Cuff: The subscapularis, supraspinatus, 

infraspinatus, and teres minor tendons are intact. No edema or fatty 

atrophy.

 

Other: The subcutaneous tissues are unremarkable.

IMPRESSION:

1. Inferior labral tear. Multiseptated paralabral cyst adjacent to the 

tear.

2. No rotator cuff tear.

3. Small amount of fluid at the subacromial subdeltoid bursa.

 

[facility]

## 2020-01-03 NOTE — XRAY REPORT
Reason:  SHOULDER INSTABILITY

Procedure Date:  01/02/2020   

Accession Number:  [id number] / [id number]                    

Procedure:  FL  - Arthrogram Needle Placement CPT Code:  

 

***Final Report***

 

 

FULL RESULT:

 

 

EXAM:

RIGHT SHOULDER ARTHROGRAPHIC INJECTION WITH FLUOROSCOPIC GUIDANCE

 

EXAM DATE: 1/2/2020 02:07 PM.

 

CLINICAL HISTORY: Shoulder instability.

 

COMPARISON: ARTHROGRAM SHOULDER RT 01/02/2020 2:25 PM.

 

TECHNIQUE: The risks, benefits, and alternatives of the procedure were 

discussed with the patient. All questions were answered. Written and 

verbal consent were obtained. The glenohumeral joint was marked under 

fluoroscopy and prepped and draped in a sterile manner. Local anesthesia 

was performed with 1% lidocaine. A 22-gauge needle was then inserted into 

the glenohumeral joint. 10 mL of a solution containing 25% 1% lidocaine, 

25% iodinated contrast, and a 1:200 dilution of gadolinium contrast in 

sterile saline was then injected. The needle was removed without 

immediate complication.

Other: None.

Fluoroscopy Time: 0.1 minutes.

Number of Images: 13.

 

FINDINGS:

Bones and joints: No fracture or subluxation.

 

Injection: Fluoroscopic images demonstrate needle placement and contrast 

in the glenohumeral joint. No contrast extravasation outside of the 

glenohumeral joint.

IMPRESSION: Successful fluoroscopically guided arthrographic injection of 

the shoulder.

 

[facility]

## 2020-03-13 ENCOUNTER — HOSPITAL ENCOUNTER (EMERGENCY)
Dept: HOSPITAL 76 - ED | Age: 30
Discharge: HOME | End: 2020-03-13
Payer: COMMERCIAL

## 2020-03-13 VITALS — DIASTOLIC BLOOD PRESSURE: 100 MMHG | SYSTOLIC BLOOD PRESSURE: 147 MMHG

## 2020-03-13 DIAGNOSIS — H66.92: Primary | ICD-10-CM

## 2020-03-13 DIAGNOSIS — H10.9: ICD-10-CM

## 2020-03-13 PROCEDURE — 99283 EMERGENCY DEPT VISIT LOW MDM: CPT

## 2020-03-13 NOTE — ED PHYSICIAN DOCUMENTATION
History of Present Illness





- Stated complaint


Stated Complaint: EYE PX





- Chief complaint


Chief Complaint: Heent





- History obtained from


History obtained from: Patient (Patient is a very pleasant 29-year-old female 

who is otherwise healthy presents tonight with a 1 day history of left ear pain 

and now discharge from her left she denies any visual loss or hearing loss 

denies any headache, neck pain, fevers or rashes.  Denies any history of corneal

refractive surgery such as PRK or LASEK.Denies any recent pressure changes such 

as diving or flying.)





Review of Systems


Constitutional: reports: Reviewed and negative


Eyes: reports: Discharge, Irritation


Ears: reports: Ear pain


Nose: reports: Reviewed and negative


Throat: reports: Reviewed and negative


Cardiac: reports: Reviewed and negative


Respiratory: reports: Reviewed and negative


GI: reports: Reviewed and negative


: reports: Reviewed and negative


Skin: reports: Reviewed and negative


Musculoskeletal: reports: Reviewed and negative


Neurologic: reports: Reviewed and negative


Psychiatric: reports: Reviewed and negative


Endocrine: reports: Reviewed and negative


Immunocompromised: reports: Reviewed and negative





PD PAST MEDICAL HISTORY





- Past Medical History


Cardiovascular: None


Respiratory: None


Neuro: None


Endocrine/Autoimmune: None


GI: None


GYN: None


: None


HEENT: Other


Psych: None


Musculoskeletal: Other


Derm: None





- Past Surgical History


Past Surgical History: Yes


Ortho: Knee replacement





- Present Medications


Home Medications: 


                                Ambulatory Orders











 Medication  Instructions  Recorded  Confirmed


 


Norethindrone-Ethinyl Estrad 1 mg PO DAILY 09/22/14 12/10/16





[Nortrel 0.5-35 Tablet]   


 


Triamcinolone Acetonide [Nasacort] 1 applic NS DAILY 09/22/14 12/10/16


 


Amox/Clav 875/125 [Augmentin] 1 each PO Q12H #20 tablet 06/19/17 


 


HYDROcod/ACETAM 5/325 [Norco 5/325] 1 - 2 ea PO Q6H PRN #10 tablet 06/19/17 


 


Cyclobenzaprine [Flexeril] 10 mg PO TID PRN #20 tablet 12/27/18 


 


Hydrocodone/Acetaminophen 1 - 2 each PO Q6H PRN #14 tablet 12/27/18 





[Hydrocodon-Acetaminophen 5-325]   


 


Tizanidine HCl 4 mg PO TID PRN #25 capsule 10/16/19 


 


Amoxicillin 875 mg PO BID #20 tablet 01/01/20 


 


Amox/Clav 875/125 [Augmentin] 1 each PO Q12H 10 Days #20 tablet 03/13/20 


 


Fluconazole [Diflucan] 150 mg PO ONCE PRN #1 tablet 03/13/20 


 


Polymyxin B/Trimeth Ophth Drop 1 drops LEFTEYE Q3H #1 drops 03/13/20 





[Polytrim Ophth Drops]   














- Allergies


Allergies/Adverse Reactions: 


                                    Allergies











Allergy/AdvReac Type Severity Reaction Status Date / Time


 


No Known Drug Allergies Allergy   Verified 01/01/20 22:59














- Social History


Does the pt smoke?: No


Smoking Status: Never smoker


Does the pt drink ETOH?: Yes


Does the pt have substance abuse?: No





- Immunizations


Immunizations are current?: Yes





- POLST


Patient has POLST: No





PD ED PE NORMAL





- Vitals


Vital signs reviewed: Yes





- General


General: Alert and oriented X 3, No acute distress, Well developed/nourished, 

Other





- HEENT


HEENT: Atraumatic, PERRL, EOMI, Moist mucous membranes, Pharynx benign, 

Dentition benign, Other (The left tympanic membrane is erythematous and bulging 

there is no discharge in the external auditory canal no pain over the tragus no 

pain of the mastoid process no preauricular lymphadenopathy, There is yellow 

discharge from the medial aspect of the left eye no foreign bodies identified 

extraocular motions intact visual acuity is intact per patient)





- Neck


Neck: Supple, no meningeal sign





- Cardiac


Cardiac: RRR, No murmur





- Respiratory


Respiratory: Clear bilaterally





- Abdomen


Abdomen: Normal bowel sounds, Soft, Non tender, Non distended





- Derm


Derm: Warm and dry





- Extremities


Extremities: No deformity





- Neuro


Neuro: Alert and oriented X 3





- Psych


Psych: Normal mood, Normal affect





Results





- Vitals


Vitals: 


                               Vital Signs - 24 hr











  03/13/20





  01:05


 


Temperature 36.7 C


 


Heart Rate 91


 


Respiratory 16





Rate 


 


Blood Pressure 147/100 H


 


O2 Saturation 100








                                     Oxygen











O2 Source                      Room air

















PD MEDICAL DECISION MAKING





- ED course


Complexity details: other (History and exam are consistent with acute 

conjunctivitis and left otitis media.)





Departure





- Departure


Disposition: 01 Home, Self Care


Clinical Impression: 


Otitis media


Qualifiers:


 Otitis media type: unspecified Chronicity: acute Qualified Code(s): H66.90 - 

Otitis media, unspecified, unspecified ear





Conjunctivitis


Qualifiers:


 Conjunctivitis type: unspecified Laterality: left Qualified Code(s): H10.9 - 

Unspecified conjunctivitis





Condition: Good


Instructions:  ED Otitis Media Acute Adult, Conjunctivitis


Follow-Up: 


FREEDOM THOMAS [Primary Care Provider] - As Needed


Prescriptions: 


Amox/Clav 875/125 [Augmentin] 1 each PO Q12H 10 Days #20 tablet


Fluconazole [Diflucan] 150 mg PO ONCE PRN #1 tablet


 PRN Reason: Vaginal itching/yeast infectio


Polymyxin B/Trimeth Ophth Drop [Polytrim Ophth Drops] 1 drops LEFTEYE Q3H #1 

drops

## 2020-11-22 ENCOUNTER — HOSPITAL ENCOUNTER (EMERGENCY)
Dept: HOSPITAL 76 - ED | Age: 30
Discharge: HOME | End: 2020-11-22
Payer: COMMERCIAL

## 2020-11-22 VITALS — SYSTOLIC BLOOD PRESSURE: 145 MMHG | DIASTOLIC BLOOD PRESSURE: 54 MMHG

## 2020-11-22 DIAGNOSIS — R07.9: ICD-10-CM

## 2020-11-22 DIAGNOSIS — R10.11: Primary | ICD-10-CM

## 2020-11-22 LAB
ALBUMIN DIAFP-MCNC: 3.9 G/DL (ref 3.2–5.5)
ALBUMIN/GLOB SERPL: 1.1 {RATIO} (ref 1–2.2)
ALP SERPL-CCNC: 92 IU/L (ref 42–121)
ALT SERPL W P-5'-P-CCNC: 16 IU/L (ref 10–60)
ANION GAP SERPL CALCULATED.4IONS-SCNC: 9 MMOL/L (ref 6–13)
AST SERPL W P-5'-P-CCNC: 14 IU/L (ref 10–42)
BASOPHILS NFR BLD AUTO: 0 10^3/UL (ref 0–0.1)
BASOPHILS NFR BLD AUTO: 0.5 %
BILIRUB BLD-MCNC: 0.6 MG/DL (ref 0.2–1)
BUN SERPL-MCNC: 11 MG/DL (ref 6–20)
CALCIUM UR-MCNC: 8.9 MG/DL (ref 8.5–10.3)
CHLORIDE SERPL-SCNC: 106 MMOL/L (ref 101–111)
CO2 SERPL-SCNC: 25 MMOL/L (ref 21–32)
CREAT SERPLBLD-SCNC: 0.7 MG/DL (ref 0.4–1)
EOSINOPHIL # BLD AUTO: 0.1 10^3/UL (ref 0–0.7)
EOSINOPHIL NFR BLD AUTO: 1.5 %
ERYTHROCYTE [DISTWIDTH] IN BLOOD BY AUTOMATED COUNT: 14.6 % (ref 12–15)
GLOBULIN SER-MCNC: 3.5 G/DL (ref 2.1–4.2)
GLUCOSE SERPL-MCNC: 83 MG/DL (ref 70–100)
HGB UR QL STRIP: 12.5 G/DL (ref 12–16)
LIPASE SERPL-CCNC: 36 U/L (ref 22–51)
LYMPHOCYTES # SPEC AUTO: 2.5 10^3/UL (ref 1.5–3.5)
LYMPHOCYTES NFR BLD AUTO: 28.8 %
MCH RBC QN AUTO: 25.2 PG (ref 27–31)
MCHC RBC AUTO-ENTMCNC: 30.7 G/DL (ref 32–36)
MCV RBC AUTO: 82.1 FL (ref 81–99)
MONOCYTES # BLD AUTO: 0.5 10^3/UL (ref 0–1)
MONOCYTES NFR BLD AUTO: 5.4 %
NEUTROPHILS # BLD AUTO: 5.5 10^3/UL (ref 1.5–6.6)
NEUTROPHILS # SNV AUTO: 8.6 X10^3/UL (ref 4.8–10.8)
NEUTROPHILS NFR BLD AUTO: 63.6 %
PDW BLD AUTO: 11.1 FL (ref 7.9–10.8)
PLATELET # BLD: 256 10^3/UL (ref 130–450)
PROT SPEC-MCNC: 7.4 G/DL (ref 6.7–8.2)
RBC MAR: 4.96 10^6/UL (ref 4.2–5.4)
SODIUM SERPLBLD-SCNC: 140 MMOL/L (ref 135–145)

## 2020-11-22 PROCEDURE — 85025 COMPLETE CBC W/AUTO DIFF WBC: CPT

## 2020-11-22 PROCEDURE — 83690 ASSAY OF LIPASE: CPT

## 2020-11-22 PROCEDURE — 71046 X-RAY EXAM CHEST 2 VIEWS: CPT

## 2020-11-22 PROCEDURE — 80053 COMPREHEN METABOLIC PANEL: CPT

## 2020-11-22 PROCEDURE — 76705 ECHO EXAM OF ABDOMEN: CPT

## 2020-11-22 PROCEDURE — 36415 COLL VENOUS BLD VENIPUNCTURE: CPT

## 2020-11-22 PROCEDURE — 99284 EMERGENCY DEPT VISIT MOD MDM: CPT

## 2020-11-22 NOTE — ED PHYSICIAN DOCUMENTATION
PD HPI ABD PAIN





- Stated complaint


Stated Complaint: ABD PX





- Chief complaint


Chief Complaint: Abd Pain





- History obtained from


History obtained from: Patient





- Additional information


Additional information: 


Patient comes emergency department complaining of a pain in her right side, 

around the junction of the abdomen and the chest.  She states is been going on 

for the last few days and seems to have gotten worse.  She denies any heavy 

lifting, twisting, or any other movement or injury that she can think of that 

would have triggered it.  No cough, fever, or shortness of breath.  No nausea or

vomiting.  The patient has no known history of gallbladder issues.  No known 

family history.  Patient denies recurrent symptoms.  She states it does hurt to 

take a deep breath and moves certain ways.  She states she tried ibuprofen and 

heat at home, but with no improvement.  No other complaints at this time.








Review of Systems


Ten Systems: 10 systems reviewed and negative


Constitutional: reports: Reviewed and negative.  denies: Fever, Chills


Eyes: reports: Reviewed and negative


Ears: reports: Reviewed and negative


Nose: reports: Reviewed and negative


Throat: reports: Reviewed and negative


Cardiac: reports: Chest pain / pressure


Respiratory: reports: Reviewed and negative


GI: reports: Abdominal Pain.  denies: Nausea, Vomiting


: reports: Reviewed and negative


Skin: reports: Reviewed and negative


Musculoskeletal: reports: Reviewed and negative


Neurologic: reports: Reviewed and negative


Psychiatric: reports: Reviewed and negative


Endocrine: reports: Reviewed and negative


Immunocompromised: reports: Reviewed and negative





PD PAST MEDICAL HISTORY





- Past Medical History


Cardiovascular: None


Respiratory: None


Neuro: None


Endocrine/Autoimmune: None


GI: None


GYN: None


: None


HEENT: Other


Psych: None


Musculoskeletal: Other


Derm: None





- Past Surgical History


Past Surgical History: Yes


Ortho: Knee replacement





- Present Medications


Home Medications: 


                                Ambulatory Orders











 Medication  Instructions  Recorded  Confirmed


 


Norethindrone-Ethinyl Estrad 1 mg PO DAILY 09/22/14 12/10/16





[Nortrel 0.5-35 Tablet]   


 


Triamcinolone Acetonide [Nasacort] 1 applic NS DAILY 09/22/14 12/10/16


 


Amox/Clav 875/125 [Augmentin] 1 each PO Q12H #20 tablet 06/19/17 


 


HYDROcod/ACETAM 5/325 [Norco 5/325] 1 - 2 ea PO Q6H PRN #10 tablet 06/19/17 


 


Cyclobenzaprine [Flexeril] 10 mg PO TID PRN #20 tablet 12/27/18 


 


Hydrocodone/Acetaminophen 1 - 2 each PO Q6H PRN #14 tablet 12/27/18 





[Hydrocodon-Acetaminophen 5-325]   


 


Tizanidine HCl 4 mg PO TID PRN #25 capsule 10/16/19 


 


Amoxicillin 875 mg PO BID #20 tablet 01/01/20 


 


Amox/Clav 875/125 [Augmentin] 1 each PO Q12H 10 Days #20 tablet 03/13/20 


 


Fluconazole [Diflucan] 150 mg PO ONCE PRN #1 tablet 03/13/20 


 


Polymyxin B/Trimeth Ophth Drop 1 drops LEFTEYE Q3H #1 drops 03/13/20 





[Polytrim Ophth Drops]   


 


Cyclobenzaprine [Flexeril] 10 mg PO TID PRN #20 tablet 11/22/20 














- Allergies


Allergies/Adverse Reactions: 


                                    Allergies











Allergy/AdvReac Type Severity Reaction Status Date / Time


 


No Known Drug Allergies Allergy   Verified 11/22/20 04:07














- Social History


Does the pt smoke?: No


Smoking Status: Never smoker


Does the pt drink ETOH?: Yes


Does the pt have substance abuse?: No





- Immunizations


Immunizations are current?: Yes





- POLST


Patient has POLST: No





PD ED PE NORMAL





- Vitals


Vital signs reviewed: Yes





- General


General: Alert and oriented X 3, No acute distress





- HEENT


HEENT: Atraumatic, PERRL, EOMI, Moist mucous membranes





- Neck


Neck: Supple, no meningeal sign





- Cardiac


Cardiac: RRR, No murmur





- Respiratory


Respiratory: No respiratory distress, Clear bilaterally





- Abdomen


Abdomen: Soft, Non distended, Other (moderate tenderness, RUQ, no RB/guarding)





- Derm


Derm: Warm and dry





- Extremities


Extremities: No deformity





- Neuro


Neuro: Alert and oriented X 3





- Psych


Psych: Normal mood, Normal affect





Results





- Vitals


Vitals: 


                               Vital Signs - 24 hr











  11/22/20 11/22/20 11/22/20





  04:00 04:15 06:11


 


Temperature 36.6 C 36.6 C 


 


Heart Rate 88 88 77


 


Respiratory 16 16 18





Rate   


 


Blood Pressure 151/91 H 151/91 H 145/54 H


 


O2 Saturation 99 99 100








                                     Oxygen











O2 Source                      Room air

















- Labs


Labs: 


                                Laboratory Tests











  11/22/20 11/22/20





  04:35 04:35


 


WBC  8.6 


 


RBC  4.96 


 


Hgb  12.5 


 


Hct  40.7 


 


MCV  82.1 


 


MCH  25.2 L 


 


MCHC  30.7 L 


 


RDW  14.6 


 


Plt Count  256 


 


MPV  11.1 H 


 


Neut # (Auto)  5.5 


 


Lymph # (Auto)  2.5 


 


Mono # (Auto)  0.5 


 


Eos # (Auto)  0.1 


 


Baso # (Auto)  0.0 


 


Absolute Nucleated RBC  0.00 


 


Nucleated RBC %  0.0 


 


Sodium   140


 


Potassium   3.3 L


 


Chloride   106


 


Carbon Dioxide   25


 


Anion Gap   9.0


 


BUN   11


 


Creatinine   0.7


 


Estimated GFR (MDRD)   100


 


Glucose   83


 


Calcium   8.9


 


Total Bilirubin   0.6


 


AST   14


 


ALT   16


 


Alkaline Phosphatase   92


 


Total Protein   7.4


 


Albumin   3.9


 


Globulin   3.5


 


Albumin/Globulin Ratio   1.1


 


Lipase   36














- Rads (name of study)


  ** RUQ US


Radiology: Final report received, See rad report (borderline hepatomegaly; 

otherwise neg)





  ** CXR


Radiology: Final report received, EMP read indepedently, See rad report (neg)





PD MEDICAL DECISION MAKING





- ED course


Complexity details: reviewed old records, reviewed results, re-evaluated 

patient, considered differential, d/w patient


ED course: 


The patient's labs and ultrasound were unremarkable.  I discussed with the 

patient that I am not exactly sure what is causing her pain, though I suspect 

that this is more of a musculoskeletal/structural etiology than an organ-based 

etiology.  We have discussed methods of management of the pain at home, and I am

 giving the patient a prescription for Flexeril.  She has also been given a dose

 of Ultram here in the emergency department.  We have discussed the usual 

indications for return.








Departure





- Departure


Disposition: 01 Home, Self Care


Clinical Impression: 


Abdominal pain


Qualifiers:


 Abdominal location: right upper quadrant Qualified Code(s): R10.11 - Right 

upper quadrant pain





Condition: Stable


Instructions:  ED Abdominal Pain Unkn Cause


Prescriptions: 


Cyclobenzaprine [Flexeril] 10 mg PO TID PRN #20 tablet


 PRN Reason: Spasms


Comments: 


Your labs, overall, look very good.  You have a slightly low potassium at 3.3, 

and if you wish, you could take a low-dose supplement for a week.  However, this

 is very near the normal range and likely is not causing any symptoms for you at

 this time, so supplementation at this time is not crucial.  Your chest x-ray 

was negative and your ultrasound did not show any problems with your gallbladder

 or liver.  It is not clear what is causing your pain, but it is most likely to 

be inflammation of one of the musculoskeletal structures in the area.  As such, 

this will most likely go away on its own, given time.  You may use ibuprofen and

 Tylenol as needed for the pain, or stronger prescription medication if needed. 

 Please follow-up with your primary care physician if you you are not any better

 in 1 week.


Discharge Date/Time: 11/22/20 06:11

## 2020-11-22 NOTE — XRAY REPORT
PROCEDURE:  Chest 2 View X-Ray

 

INDICATIONS:  cough

 

TECHNIQUE:  2 view(s) of the chest.  

 

COMPARISON:  Correlation is made with the accompanying limited abdomen ultrasound, 11/22/2020.

 

FINDINGS:  

 

Surgical changes and devices:  None.  

 

Lungs and pleura:  No pleural effusions or pneumothorax.  Lungs are clear.  

 

Mediastinum:  Mediastinal contours are normal.  Heart size is normal.  

 

Bones and chest wall:  No suspicious bony abnormalities.  Mild levoconvex scoliotic curvature is seen
.   Soft tissues appear unremarkable.  

 

 

 

IMPRESSION:  No acute cardiopulmonary process is seen.  

 

 

Note: No significant discrepancy from the preliminary report.

 

Reviewed by: Jameson Villalobos MD on 11/22/2020 8:59 AM [facility]

Approved by: Jameson Villalobos MD on 11/22/2020 8:59 AM [facility]

 

 

Station ID:  [id number]

## 2020-11-22 NOTE — ULTRASOUND REPORT
PROCEDURE: Abdomen Limited

 

INDICATIONS:  RUQ pain x 2 days, worsening

 

TECHNIQUE:  

Real-time focused scanning was performed of the abdomen, with image documentation.  

 

COMPARISON:  Correlation is made with the accompanying chest radiograph, 11/22/2020

 

FINDINGS:  The liver demonstrates normal size. Generalized mildly increased liver echogenicity is see
n. No liver lesions are detected.  

 

The gallbladder is mildly contracted, as the patient is not nothing by mouth, which limits its evalua
tion. No gallstones or sludge can be seen. The gallbladder wall is minimally thickened at 3.4 mm, whi
ch is considered to be artifactual, given the contracted gallbladder. There is no specific pericholec
ystic fluid. The sonographic Gallardo's sign is negative.  

 

No biliary ductal dilatation is seen. The common bile duct measures 4 mm.  

 

The pancreas is not well seen.

 

The visualized right kidney is unremarkable.

 

 

 

 

 

IMPRESSION:  

 

The gallbladder demonstrates a normal sonographic appearance. No biliary dilatation is seen.

 

Mild fatty liver infiltration.

 

 

Note: No significant discrepancy from the preliminary report.

 

Reviewed by: Jameson Villalobos MD on 11/22/2020 9:01 AM [facility]

Approved by: Jameson Villalobos MD on 11/22/2020 9:01 AM [facility]

 

 

Station ID:  [id number]

## 2021-02-14 ENCOUNTER — HOSPITAL ENCOUNTER (EMERGENCY)
Dept: HOSPITAL 76 - ED | Age: 31
Discharge: HOME | End: 2021-02-14
Payer: COMMERCIAL

## 2021-02-14 VITALS — SYSTOLIC BLOOD PRESSURE: 147 MMHG | DIASTOLIC BLOOD PRESSURE: 98 MMHG

## 2021-02-14 DIAGNOSIS — R00.0: ICD-10-CM

## 2021-02-14 DIAGNOSIS — S83.411A: Primary | ICD-10-CM

## 2021-02-14 DIAGNOSIS — F41.9: ICD-10-CM
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
## 2021-02-14 LAB
ALBUMIN DIAFP-MCNC: 4.2 G/DL (ref 3.2–5.5)
ALBUMIN/GLOB SERPL: 1.2 {RATIO} (ref 1–2.2)
ALP SERPL-CCNC: 104 IU/L (ref 42–121)
ALT SERPL W P-5'-P-CCNC: 17 IU/L (ref 10–60)
ANION GAP SERPL CALCULATED.4IONS-SCNC: 16 MMOL/L (ref 6–13)
AST SERPL W P-5'-P-CCNC: 20 IU/L (ref 10–42)
BASOPHILS NFR BLD AUTO: 0.1 10^3/UL (ref 0–0.1)
BASOPHILS NFR BLD AUTO: 0.5 %
BILIRUB BLD-MCNC: 0.3 MG/DL (ref 0.2–1)
BUN SERPL-MCNC: 15 MG/DL (ref 6–20)
CALCIUM UR-MCNC: 9.4 MG/DL (ref 8.5–10.3)
CHLORIDE SERPL-SCNC: 99 MMOL/L (ref 101–111)
CO2 SERPL-SCNC: 27 MMOL/L (ref 21–32)
CREAT SERPLBLD-SCNC: 0.9 MG/DL (ref 0.4–1)
EOSINOPHIL # BLD AUTO: 0 10^3/UL (ref 0–0.7)
EOSINOPHIL NFR BLD AUTO: 0.4 %
ERYTHROCYTE [DISTWIDTH] IN BLOOD BY AUTOMATED COUNT: 14.6 % (ref 12–15)
GLOBULIN SER-MCNC: 3.5 G/DL (ref 2.1–4.2)
GLUCOSE SERPL-MCNC: 122 MG/DL (ref 70–100)
HGB UR QL STRIP: 13 G/DL (ref 12–16)
LIPASE SERPL-CCNC: 29 U/L (ref 22–51)
LYMPHOCYTES # SPEC AUTO: 2 10^3/UL (ref 1.5–3.5)
LYMPHOCYTES NFR BLD AUTO: 20.8 %
MCH RBC QN AUTO: 25.9 PG (ref 27–31)
MCHC RBC AUTO-ENTMCNC: 31.2 G/DL (ref 32–36)
MCV RBC AUTO: 83.1 FL (ref 81–99)
MONOCYTES # BLD AUTO: 0.6 10^3/UL (ref 0–1)
MONOCYTES NFR BLD AUTO: 6 %
NEUTROPHILS # BLD AUTO: 6.9 10^3/UL (ref 1.5–6.6)
NEUTROPHILS # SNV AUTO: 9.5 X10^3/UL (ref 4.8–10.8)
NEUTROPHILS NFR BLD AUTO: 72.2 %
PDW BLD AUTO: 10.8 FL (ref 7.9–10.8)
PLATELET # BLD: 295 10^3/UL (ref 130–450)
PROT SPEC-MCNC: 7.7 G/DL (ref 6.7–8.2)
RBC MAR: 5.02 10^6/UL (ref 4.2–5.4)

## 2021-02-14 PROCEDURE — 83690 ASSAY OF LIPASE: CPT

## 2021-02-14 PROCEDURE — 96374 THER/PROPH/DIAG INJ IV PUSH: CPT

## 2021-02-14 PROCEDURE — 93005 ELECTROCARDIOGRAM TRACING: CPT

## 2021-02-14 PROCEDURE — 36415 COLL VENOUS BLD VENIPUNCTURE: CPT

## 2021-02-14 PROCEDURE — 99284 EMERGENCY DEPT VISIT MOD MDM: CPT

## 2021-02-14 PROCEDURE — 85025 COMPLETE CBC W/AUTO DIFF WBC: CPT

## 2021-02-14 PROCEDURE — 80053 COMPREHEN METABOLIC PANEL: CPT

## 2021-02-14 PROCEDURE — 73562 X-RAY EXAM OF KNEE 3: CPT

## 2021-02-14 PROCEDURE — 84443 ASSAY THYROID STIM HORMONE: CPT

## 2021-02-14 PROCEDURE — 96361 HYDRATE IV INFUSION ADD-ON: CPT

## 2021-02-14 NOTE — XRAY REPORT
PROCEDURE:  Knee 3 View RT

 

INDICATIONS:  fall/injury

 

TECHNIQUE:  3 views of the right knee(s) were acquired.  

 

COMPARISON:  None.

 

FINDINGS:  

 

Bones: There is a subtle lucency noted over the fibular head on the frontal view as well as a lucency
 over the proximal margin of the fibular head on the lateral view. This may be artifactual secondary 
to overlapping structures. Otherwise, no fracture visualized. Alignment is anatomic.  No suspicious b
andrew lesions.  

 

Soft tissues:  No joint effusion.  No suspicious soft tissue calcifications.  

 

IMPRESSION:  Lucency involving the proximal right fibular head which may be artifactual in etiology. 
However, given history of trauma, a nondisplaced fibular head fracture not excluded if there is point
 tenderness in this region.

 

Reviewed by: Raymon Chao MD on 2/14/2021 10:40 PM PST

Approved by: Raymon Chao MD on 2/14/2021 10:40 PM PST

 

 

Station ID:  [id number]

## 2021-02-14 NOTE — ED PHYSICIAN DOCUMENTATION
History of Present Illness





- Stated complaint


Stated Complaint: R KNEE PX





- Chief complaint


Chief Complaint: Ext Problem





- History obtained from


History obtained from: Patient





- Additonal information


Additional information: 


Patient comes emergency department chief complaint of right medial knee pain 

after slipping on ice and falling.  Patient states that her foot slid out to the

side and she came down hard on her flexed knee.  She states she felt instant 

pain and a "pop" in the medial portion of her knee.  Patient states that The 

injury occurred at roughly 4:00 this morning.  She took a Vicodin that she had 

at home and immediately went to bed.  She states the pain in her knee awakened 

her and that although she has been able to hobble around a little bit, it hurts 

quite a bit to bear weight.  Patient denies prior injury to the knee.  She 

denies any other injury during the fall.  Her other concern is that after she 

woke up from sleeping, she got in the shower and felt as though she was going to

lose consciousness.  She also felt as though her heart began to race.  She 

states she had a couple episodes like that during the day and that was 

ultimately what ended up causing her to come to the emergency department.  She 

states that she feels that she may be anxious, but she has never had anxiety 

last this long, and she has never had the sense of palpitations and near 

fainting that she had today.  Patient states she is otherwise healthy.  She is 

not known to have any thyroid issues.  No cardiac issues.  No other complaints 

at this time.








Review of Systems


Ten Systems: 10 systems reviewed and negative


Constitutional: reports: Reviewed and negative


Eyes: reports: Reviewed and negative


Ears: reports: Reviewed and negative


Nose: reports: Reviewed and negative


Throat: reports: Reviewed and negative


Cardiac: reports: Reviewed and negative


Respiratory: reports: Reviewed and negative


GI: reports: Reviewed and negative


: reports: Reviewed and negative


Skin: reports: Reviewed and negative


Musculoskeletal: reports: Joint pain, Joint swelling, Pain with weight bearing


Neurologic: reports: Reviewed and negative


Psychiatric: reports: Reviewed and negative


Endocrine: reports: Reviewed and negative


Immunocompromised: reports: Reviewed and negative





PD PAST MEDICAL HISTORY





- Past Medical History


Cardiovascular: None


Respiratory: None


Neuro: None


Endocrine/Autoimmune: None


GI: None


GYN: None


: None


HEENT: Other


Psych: None


Musculoskeletal: Other


Derm: None





- Past Surgical History


Past Surgical History: Yes


Ortho: Knee replacement





- Present Medications


Home Medications: 


                                Ambulatory Orders











 Medication  Instructions  Recorded  Confirmed


 


Norethindrone-Ethinyl Estrad 1 mg PO DAILY 09/22/14 12/10/16





[Nortrel 0.5-35 Tablet]   


 


Triamcinolone Acetonide [Nasacort] 1 applic NS DAILY 09/22/14 12/10/16


 


Amox/Clav 875/125 [Augmentin] 1 each PO Q12H #20 tablet 06/19/17 


 


HYDROcod/ACETAM 5/325 [Norco 5/325] 1 - 2 ea PO Q6H PRN #10 tablet 06/19/17 


 


Cyclobenzaprine [Flexeril] 10 mg PO TID PRN #20 tablet 12/27/18 


 


Hydrocodone/Acetaminophen 1 - 2 each PO Q6H PRN #14 tablet 12/27/18 





[Hydrocodon-Acetaminophen 5-325]   


 


Tizanidine HCl 4 mg PO TID PRN #25 capsule 10/16/19 


 


Amoxicillin 875 mg PO BID #20 tablet 01/01/20 


 


Amox/Clav 875/125 [Augmentin] 1 each PO Q12H 10 Days #20 tablet 03/13/20 


 


Fluconazole [Diflucan] 150 mg PO ONCE PRN #1 tablet 03/13/20 


 


Polymyxin B/Trimeth Ophth Drop 1 drops LEFTEYE Q3H #1 drops 03/13/20 





[Polytrim Ophth Drops]   


 


Cyclobenzaprine [Flexeril] 10 mg PO TID PRN #20 tablet 11/22/20 


 


HYDROcod/ACETAM 5/325 [Norco 5/325] 1 - 2 ea PO Q6H PRN #12 02/14/21 














- Allergies


Allergies/Adverse Reactions: 


                                    Allergies











Allergy/AdvReac Type Severity Reaction Status Date / Time


 


No Known Drug Allergies Allergy   Verified 11/22/20 04:07














- Social History


Does the pt smoke?: No


Smoking Status: Never smoker


Does the pt drink ETOH?: Yes


Does the pt have substance abuse?: No





- Immunizations


Immunizations are current?: Yes





- POLST


Patient has POLST: No





PD ED PE NORMAL





- Vitals


Vital signs reviewed: Yes





- General


General: Alert and oriented X 3, No acute distress





- HEENT


HEENT: Atraumatic, PERRL, EOMI, Moist mucous membranes





- Neck


Neck: Supple, no meningeal sign





- Cardiac


Cardiac: No murmur, Other (Moderate tachycardia, regular rhythm.)





- Respiratory


Respiratory: No respiratory distress, Clear bilaterally





- Derm


Derm: Normal color, Warm and dry, No rash





- Extremities


Extremities: No deformity, No edema, Other (Mild edema and tenderness of the 

medial aspect of right knee, especially adjacent to patella.  Limited range of 

motion of knee secondary to pain.  No anterior posterior or medial lateral 

instability.  Small effusion palpable.)





- Neuro


Neuro: Alert and oriented X 3, CNs 2-12 intact, No motor deficit, No sensory 

deficit, Normal speech, Other (Grossly normal)





- Psych


Psych: Normal mood, Normal affect





Results





- Vitals


Vitals: 


                               Vital Signs - 24 hr











  02/14/21 02/14/21 02/14/21





  21:14 23:15 23:16


 


Temperature 36.4 C L  


 


Heart Rate 126 H 86 86


 


Respiratory 18 16 13





Rate   


 


Blood Pressure 172/93 H 147/98 H 147/98 H


 


O2 Saturation 99 98 98








                                     Oxygen











O2 Source                      Room air

















- EKG (time done)


  ** 2256


Rate: Rate (enter#) (85)


Rhythm: NSR


Axis: Normal


Intervals: Normal TX


QRS: Normal


Ischemia: Q waves (Borderline, lateral leads.)


Compare to prior EKG: Old EKG unavailable


Computer interpretation: Agree with computer





- Labs


Labs: 


                                Laboratory Tests











  02/14/21 02/14/21





  22:00 22:00


 


WBC  9.5 


 


RBC  5.02 


 


Hgb  13.0 


 


Hct  41.7 


 


MCV  83.1 


 


MCH  25.9 L 


 


MCHC  31.2 L 


 


RDW  14.6 


 


Plt Count  295 


 


MPV  10.8 


 


Neut # (Auto)  6.9 H 


 


Lymph # (Auto)  2.0 


 


Mono # (Auto)  0.6 


 


Eos # (Auto)  0.0 


 


Baso # (Auto)  0.1 


 


Absolute Nucleated RBC  0.00 


 


Nucleated RBC %  0.0 


 


Sodium   142


 


Potassium   3.6


 


Chloride   99 L


 


Carbon Dioxide   27


 


Anion Gap   16.0 H


 


BUN   15


 


Creatinine   0.9


 


Estimated GFR (MDRD)   75 L


 


Glucose   122 H


 


Calcium   9.4


 


Total Bilirubin   0.3


 


AST   20


 


ALT   17


 


Alkaline Phosphatase   104


 


Total Protein   7.7


 


Albumin   4.2


 


Globulin   3.5


 


Albumin/Globulin Ratio   1.2


 


Lipase   29














- Rads (name of study)


  ** Right knee x-ray


Radiology: Final report received, EMP read indepedently, See rad report 

(Negative)





PD MEDICAL DECISION MAKING





- ED course


Complexity details: reviewed results, re-evaluated patient, considered 

differential, d/w patient


ED course: 


The patient was somewhat tachycardic upon arrival.  I ordered laboratory studies

to evaluate this, and patient was treated symptomatically with IV fluids, 

Toradol, ativan and hydrocodone.  She was sent for an x-ray series of the knee, 

which was unremarkable.  EKG was done around the time patient's IV fluids fini

shed, and patient was found to have heart rate improved to 85 on EKG.  I 

discussed with the patient that her tachycardia may be simply due to pain 

increase epinephrine levels related to the pain in the injury, as well as 

anxiety.  Patient has been placed in a knee immobilizer and given a pair of 

crutches.  I have discussed with her that she will need to follow-up with her 

primary doctor in the next couple of weeks if she is not feeling better, for 

reevaluation and to discuss Getting scheduled for MRI.  I have given him the 

patient a prepack and prescription for Vicodin for her pain.  She may take this 

with ibuprofen.  We have discussed the usual indications for return.








Departure





- Departure


Disposition: 01 Home, Self Care


Clinical Impression: 


 Tachycardia, Anxiety





Right knee sprain


Qualifiers:


 Encounter type: initial encounter Involved ligament of knee: medial collateral 

ligament Qualified Code(s): S83.411A - Sprain of medial collateral ligament of 

right knee, initial encounter





Condition: Stable


Instructions:  ED Sprain Knee


Prescriptions: 


HYDROcod/ACETAM 5/325 [Norco 5/325] 1 - 2 ea PO Q6H PRN #12


 PRN Reason: Pain


Comments: 


Your labs, EKG, and Knee x-ray series look good.  Your EKG shows somewhat of an 

elevated heart rate.  You have been given IV fluids as well as medication for 

pain and anxiety here in the emergency department.  Most likely, the elevated 

heart rate is secondary to pain and perhaps some anxiety/increased adrenaline 

levels from the injury.  You should keep tabs on your heart rate over the next 

couple of days and see if it continues to be high.  If it does, you should 

follow-up with your primary care physician to discuss further management.  

Please be sure to drink plenty of water be sure you are well-hydrated.  You may 

take ibuprofen and the Vicodin as needed for the pain.  Please keep the knee 

immobilizer on until either your knee is feeling better, or you are able to 

follow-up with your doctor and get MRI done. You may bear weight as tolerated, 

with or without the knee immobilizer and crutches, depending how your knee is 

feeling.


Discharge Date/Time: 02/14/21 23:15

## 2021-05-02 ENCOUNTER — HOSPITAL ENCOUNTER (EMERGENCY)
Dept: HOSPITAL 76 - ED | Age: 31
Discharge: HOME | End: 2021-05-02
Payer: COMMERCIAL

## 2021-05-02 VITALS — DIASTOLIC BLOOD PRESSURE: 90 MMHG | SYSTOLIC BLOOD PRESSURE: 138 MMHG
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
## 2021-05-02 DIAGNOSIS — S09.91XA: ICD-10-CM

## 2021-05-02 DIAGNOSIS — S50.812A: ICD-10-CM

## 2021-05-02 DIAGNOSIS — S50.811A: Primary | ICD-10-CM
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
## 2021-05-02 DIAGNOSIS — Z23: ICD-10-CM

## 2021-05-02 PROCEDURE — 99282 EMERGENCY DEPT VISIT SF MDM: CPT

## 2021-05-02 PROCEDURE — 90471 IMMUNIZATION ADMIN: CPT

## 2021-05-02 PROCEDURE — 99283 EMERGENCY DEPT VISIT LOW MDM: CPT

## 2021-05-02 PROCEDURE — 90715 TDAP VACCINE 7 YRS/> IM: CPT

## 2021-05-02 NOTE — EXTERNAL MEDICAL SUMMARY RPT
Continuity of Care Document

                             Created on:May 2, 2021



Patient:Aisha Jeter

Sex:Female

:1990

External Reference #:[id number]





Demographics







                          Phone                     Unavailable

 

                          Preferred Language        English

 

                          Marital Status            Unknown

 

                          Confucianist Affiliation     Unknown

 

                          Race                      Unknown

 

                          Ethnic Group              Unknown









Author







                          Organization              [facility]

 

                          [place]                    [place] [address]

 

                          Phone                     [phone]









Care Team Providers







                    Name                Role                Phone

 

                    Fabienne           Unavailable         Unavailable









Problems







                     date                description         facility

 

                     2021            Unspecified injury of unspecified lower

 leg, initial  [facility]



                                        encount             

 

                     2021            Pain in right knee  [facility]







Social History







                     date                description         facility

 

                     [phone]

## 2021-05-02 NOTE — EXTERNAL MEDICAL SUMMARY RPT
Continuity of Care Document

                             Created on:May 2, 2021



Patient:Aisha Jeter

Sex:Female

:1990

External Reference #:[id number]





Demographics







                          Phone                     Unavailable

 

                          Preferred Language        English

 

                          Marital Status            Unknown

 

                          Voodoo Affiliation     Unknown

 

                          Race                      Unknown

 

                          Ethnic Group              Unknown









Author







                          Organization              [facility]

 

                          [place] [address]

 

                          Phone                     [phone]









Care Team Providers







                    Name                Role                Phone

 

                    Fabienne           Unavailable         Unavailable









Problems







                     date                description         facility

 

                     2021            Unspecified injury of unspecified lower

 leg, initial  [facility]



                                        encount             

 

                     2021            Pain in right knee  [facility]







Social History







                     date                description         facility

 

                     [phone]
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
